# Patient Record
Sex: FEMALE | Race: WHITE | NOT HISPANIC OR LATINO | Employment: OTHER | URBAN - NONMETROPOLITAN AREA
[De-identification: names, ages, dates, MRNs, and addresses within clinical notes are randomized per-mention and may not be internally consistent; named-entity substitution may affect disease eponyms.]

---

## 2021-06-22 DIAGNOSIS — N64.4 MASTODYNIA: ICD-10-CM

## 2021-07-14 ENCOUNTER — HOSPITAL ENCOUNTER (OUTPATIENT)
Dept: RADIOLOGY | Facility: CLINIC | Age: 61
Discharge: HOME | End: 2021-07-14
Payer: COMMERCIAL

## 2021-07-14 VITALS — WEIGHT: 165 LBS | HEIGHT: 62 IN | BODY MASS INDEX: 30.36 KG/M2

## 2021-07-14 DIAGNOSIS — N64.4 MASTODYNIA: ICD-10-CM

## 2021-07-14 PROCEDURE — 77066 DX MAMMO INCL CAD BI: CPT

## 2021-07-14 PROCEDURE — 76642 ULTRASOUND BREAST LIMITED: CPT

## 2021-07-14 PROCEDURE — G0279 TOMOSYNTHESIS, MAMMO: HCPCS

## 2022-04-06 ENCOUNTER — DOCTOR'S OFFICE (OUTPATIENT)
Dept: URBAN - NONMETROPOLITAN AREA CLINIC 1 | Facility: CLINIC | Age: 62
Setting detail: OPHTHALMOLOGY
End: 2022-04-06
Payer: COMMERCIAL

## 2022-04-06 ENCOUNTER — OPTICAL OFFICE (OUTPATIENT)
Dept: URBAN - NONMETROPOLITAN AREA CLINIC 4 | Facility: CLINIC | Age: 62
Setting detail: OPHTHALMOLOGY
End: 2022-04-06
Payer: COMMERCIAL

## 2022-04-06 DIAGNOSIS — E11.3293: ICD-10-CM

## 2022-04-06 DIAGNOSIS — H52.223: ICD-10-CM

## 2022-04-06 DIAGNOSIS — H52.4: ICD-10-CM

## 2022-04-06 DIAGNOSIS — H40.023: ICD-10-CM

## 2022-04-06 DIAGNOSIS — H25.013: ICD-10-CM

## 2022-04-06 PROCEDURE — 92004 COMPRE OPH EXAM NEW PT 1/>: CPT | Performed by: OPTOMETRIST

## 2022-04-06 PROCEDURE — V2020 VISION SVCS FRAMES PURCHASES: HCPCS | Performed by: OPTOMETRIST

## 2022-04-06 PROCEDURE — 92015 DETERMINE REFRACTIVE STATE: CPT | Performed by: OPTOMETRIST

## 2022-04-06 PROCEDURE — V2784 LENS POLYCARB OR EQUAL: HCPCS | Performed by: OPTOMETRIST

## 2022-04-06 PROCEDURE — V2203 LENS SPHCYL BIFOCAL 4.00D/.1: HCPCS | Performed by: OPTOMETRIST

## 2022-04-06 PROCEDURE — 92134 CPTRZ OPH DX IMG PST SGM RTA: CPT | Performed by: OPTOMETRIST

## 2022-04-06 PROCEDURE — V2202 LENS SPHERE BIFOCAL 7.12-20.: HCPCS | Performed by: OPTOMETRIST

## 2022-04-06 ASSESSMENT — REFRACTION_MANIFEST
OD_VA1: 20/25-2
OS_CYLINDER: -1.75
OD_VA2: 20/25-2
OD_CYLINDER: -1.25
OD_AXIS: 105
OS_VA2: 20/25-2
OD_SPHERE: PLANO
OD_ADD: +2.50
OS_SPHERE: +0.25
OS_VA1: 20/25-2
OS_ADD: +2.50
OS_AXIS: 075

## 2022-04-06 ASSESSMENT — VISUAL ACUITY
OS_BCVA: 20/30
OD_BCVA: 20/30-2

## 2022-04-06 ASSESSMENT — TONOMETRY
OS_IOP_MMHG: 18
OD_IOP_MMHG: 18

## 2022-04-06 ASSESSMENT — CONFRONTATIONAL VISUAL FIELD TEST (CVF)
OD_FINDINGS: FULL
OS_FINDINGS: FULL

## 2022-04-06 ASSESSMENT — SPHEQUIV_DERIVED: OS_SPHEQUIV: -0.625

## 2022-05-04 ENCOUNTER — DOCTOR'S OFFICE (OUTPATIENT)
Dept: URBAN - NONMETROPOLITAN AREA CLINIC 1 | Facility: CLINIC | Age: 62
Setting detail: OPHTHALMOLOGY
End: 2022-05-04

## 2022-05-04 DIAGNOSIS — H52.4: ICD-10-CM

## 2022-05-04 PROCEDURE — RX CHECK RX CHECK: Performed by: OPTOMETRIST

## 2022-05-04 ASSESSMENT — REFRACTION_AUTOREFRACTION
OS_SPHERE: +1.00
OD_CYLINDER: -1.50
OS_CYLINDER: -1.25
OD_SPHERE: +0.75
OD_AXIS: 112
OS_AXIS: 077

## 2022-05-04 ASSESSMENT — REFRACTION_MANIFEST
OD_VA2: 20/25-2
OD_SPHERE: PLANO
OS_CYLINDER: -1.75
OS_ADD: +2.50
OD_AXIS: 105
OD_ADD: +2.50
OD_CYLINDER: -1.25
OS_SPHERE: +0.25
OS_AXIS: 075
OS_VA1: 20/25-2
OS_VA2: 20/25-2
OD_VA1: 20/25-2

## 2022-05-04 ASSESSMENT — VISUAL ACUITY
OD_BCVA: 20/25+1
OS_BCVA: 20/25

## 2022-05-04 ASSESSMENT — SPHEQUIV_DERIVED
OS_SPHEQUIV: 0.375
OS_SPHEQUIV: -0.625
OD_SPHEQUIV: 0

## 2022-05-04 ASSESSMENT — CONFRONTATIONAL VISUAL FIELD TEST (CVF)
OS_FINDINGS: FULL
OD_FINDINGS: FULL

## 2022-05-12 ENCOUNTER — OPTICAL OFFICE (OUTPATIENT)
Dept: URBAN - NONMETROPOLITAN AREA CLINIC 4 | Facility: CLINIC | Age: 62
Setting detail: OPHTHALMOLOGY
End: 2022-05-12
Payer: COMMERCIAL

## 2022-05-12 DIAGNOSIS — H52.223: ICD-10-CM

## 2022-05-12 PROCEDURE — V2020 VISION SVCS FRAMES PURCHASES: HCPCS | Performed by: OPTOMETRIST

## 2022-05-12 PROCEDURE — V2784 LENS POLYCARB OR EQUAL: HCPCS | Performed by: OPTOMETRIST

## 2022-05-12 PROCEDURE — V2202 LENS SPHERE BIFOCAL 7.12-20.: HCPCS | Performed by: OPTOMETRIST

## 2022-05-12 PROCEDURE — V2203 LENS SPHCYL BIFOCAL 4.00D/.1: HCPCS | Performed by: OPTOMETRIST

## 2022-05-12 PROCEDURE — V2799 MISC VISION ITEM OR SERVICE: HCPCS | Performed by: OPTOMETRIST

## 2022-06-14 ENCOUNTER — DOCTOR'S OFFICE (OUTPATIENT)
Dept: URBAN - NONMETROPOLITAN AREA CLINIC 1 | Facility: CLINIC | Age: 62
Setting detail: OPHTHALMOLOGY
End: 2022-06-14
Payer: COMMERCIAL

## 2022-06-14 DIAGNOSIS — H52.4: ICD-10-CM

## 2022-06-14 DIAGNOSIS — H52.223: ICD-10-CM

## 2022-06-14 PROBLEM — H25.013 CORTICAL CATARACT; BOTH EYES: Status: ACTIVE | Noted: 2022-04-06

## 2022-06-14 PROBLEM — E11.3293 DM TYPE 2; BOTH MILD WITHOUT ME: Status: ACTIVE | Noted: 2022-04-06

## 2022-06-14 PROBLEM — H40.023 GLAUCOMA SUSPECT, HIGH RISK; BOTH EYES: Status: ACTIVE | Noted: 2022-04-06

## 2022-06-14 PROCEDURE — RX CHECK RX CHECK: Performed by: OPTOMETRIST

## 2022-06-14 ASSESSMENT — REFRACTION_AUTOREFRACTION
OS_CYLINDER: -1.50
OS_AXIS: 084
OS_SPHERE: +1.00
OD_CYLINDER: -1.75
OD_AXIS: 112
OD_SPHERE: +0.25

## 2022-06-14 ASSESSMENT — REFRACTION_CURRENTRX
OS_ADD: +1.00
OD_OVR_VA: 20/
OS_OVR_VA: 20/
OS_CYLINDER: -1.75
OD_VPRISM_DIRECTION: BF
OD_SPHERE: PLANO
OS_OVR_VA: 20/
OS_VPRISM_DIRECTION: BF
OD_OVR_VA: 20/
OD_CYLINDER: SPH
OS_SPHERE: PLANO
OD_ADD: +2.50
OD_SPHERE: +0.25
OS_CYLINDER: SPH
OS_VPRISM_DIRECTION: BF
OD_CYLINDER: -1.25
OD_VPRISM_DIRECTION: BF
OD_ADD: +1.00
OD_AXIS: 105
OS_SPHERE: +0.25
OS_AXIS: 078
OS_ADD: +2.50

## 2022-06-14 ASSESSMENT — SPHEQUIV_DERIVED
OS_SPHEQUIV: 0.25
OS_SPHEQUIV: -0.625
OD_SPHEQUIV: -0.625

## 2022-06-14 ASSESSMENT — REFRACTION_MANIFEST
OD_SPHERE: PLANO
OS_VA1: 20/25-2
OS_CYLINDER: -1.75
OD_VA1: 20/25-2
OD_AXIS: 105
OS_SPHERE: +0.25
OS_AXIS: 075
OD_VA2: 20/25-2
OS_ADD: +1.75
OD_ADD: +1.75
OD_CYLINDER: -1.25
OS_VA2: 20/25-2

## 2022-06-14 ASSESSMENT — VISUAL ACUITY
OD_BCVA: 20/40
OS_BCVA: 20/40

## 2022-06-14 ASSESSMENT — CONFRONTATIONAL VISUAL FIELD TEST (CVF)
OD_FINDINGS: FULL
OS_FINDINGS: FULL

## 2022-11-16 ENCOUNTER — HOSPITAL ENCOUNTER (OUTPATIENT)
Dept: RADIOLOGY | Facility: CLINIC | Age: 62
Discharge: HOME | End: 2022-11-16

## 2022-11-16 VITALS — WEIGHT: 187 LBS | BODY MASS INDEX: 34.41 KG/M2 | HEIGHT: 62 IN

## 2022-11-16 DIAGNOSIS — N64.4 MASTODYNIA: ICD-10-CM

## 2022-11-16 DIAGNOSIS — N64.4 BREAST PAIN, LEFT: ICD-10-CM

## 2022-12-06 ENCOUNTER — DOCTOR'S OFFICE (OUTPATIENT)
Dept: URBAN - NONMETROPOLITAN AREA CLINIC 1 | Facility: CLINIC | Age: 62
Setting detail: OPHTHALMOLOGY
End: 2022-12-06
Payer: COMMERCIAL

## 2022-12-06 DIAGNOSIS — H25.013: ICD-10-CM

## 2022-12-06 DIAGNOSIS — H40.023: ICD-10-CM

## 2022-12-06 DIAGNOSIS — E11.3293: ICD-10-CM

## 2022-12-06 PROCEDURE — 92014 COMPRE OPH EXAM EST PT 1/>: CPT | Performed by: OPTOMETRIST

## 2022-12-06 PROCEDURE — 92133 CPTRZD OPH DX IMG PST SGM ON: CPT | Performed by: OPTOMETRIST

## 2022-12-06 ASSESSMENT — CONFRONTATIONAL VISUAL FIELD TEST (CVF)
OD_FINDINGS: FULL
OS_FINDINGS: FULL

## 2022-12-06 ASSESSMENT — TONOMETRY
OD_IOP_MMHG: 16
OS_IOP_MMHG: 16

## 2022-12-07 ASSESSMENT — REFRACTION_CURRENTRX
OS_SPHERE: +0.25
OS_ADD: +1.75
OS_VPRISM_DIRECTION: BF
OS_VPRISM_DIRECTION: BF
OD_CYLINDER: -1.25
OD_ADD: +1.00
OD_SPHERE: PLANO
OD_AXIS: 103
OS_SPHERE: PLANO
OD_OVR_VA: 20/
OD_SPHERE: PLANO
OD_VPRISM_DIRECTION: BF
OD_OVR_VA: 20/
OS_CYLINDER: -1.75
OD_ADD: +1.75
OS_ADD: +1.00
OS_CYLINDER: SPH
OD_CYLINDER: SPH
OS_OVR_VA: 20/
OD_VPRISM_DIRECTION: BF
OS_AXIS: 078
OS_OVR_VA: 20/

## 2022-12-07 ASSESSMENT — REFRACTION_AUTOREFRACTION
OS_CYLINDER: -1.75
OS_AXIS: 068
OD_SPHERE: +0.50
OS_SPHERE: +1.00
OD_AXIS: 111
OD_CYLINDER: -1.50

## 2022-12-07 ASSESSMENT — REFRACTION_MANIFEST
OD_CYLINDER: -1.25
OS_VA2: 20/25-2
OS_AXIS: 075
OS_VA1: 20/25-2
OS_ADD: +1.75
OD_ADD: +1.75
OD_SPHERE: PLANO
OS_SPHERE: +0.25
OD_VA1: 20/25-2
OS_CYLINDER: -1.75
OD_VA2: 20/25-2
OD_AXIS: 105

## 2022-12-07 ASSESSMENT — VISUAL ACUITY
OS_BCVA: 20/50-1
OD_BCVA: 20/30-1

## 2022-12-07 ASSESSMENT — SPHEQUIV_DERIVED
OS_SPHEQUIV: -0.625
OD_SPHEQUIV: -0.25
OS_SPHEQUIV: 0.125

## 2023-06-02 ENCOUNTER — RX ONLY (RX ONLY)
Age: 63
End: 2023-06-02

## 2023-06-02 ENCOUNTER — DOCTOR'S OFFICE (OUTPATIENT)
Dept: URBAN - NONMETROPOLITAN AREA CLINIC 1 | Facility: CLINIC | Age: 63
Setting detail: OPHTHALMOLOGY
End: 2023-06-02
Payer: COMMERCIAL

## 2023-06-02 DIAGNOSIS — H53.15: ICD-10-CM

## 2023-06-02 DIAGNOSIS — E11.3293: ICD-10-CM

## 2023-06-02 DIAGNOSIS — H40.023: ICD-10-CM

## 2023-06-02 DIAGNOSIS — H25.013: ICD-10-CM

## 2023-06-02 PROCEDURE — 92134 CPTRZ OPH DX IMG PST SGM RTA: CPT | Performed by: OPTOMETRIST

## 2023-06-02 PROCEDURE — 92014 COMPRE OPH EXAM EST PT 1/>: CPT | Performed by: OPTOMETRIST

## 2023-06-02 ASSESSMENT — SPHEQUIV_DERIVED
OS_SPHEQUIV: 0.25
OS_SPHEQUIV: -0.625
OD_SPHEQUIV: 0

## 2023-06-02 ASSESSMENT — TONOMETRY
OS_IOP_MMHG: 16
OD_IOP_MMHG: 16

## 2023-06-02 ASSESSMENT — REFRACTION_MANIFEST
OD_VA2: 20/25-2
OS_CYLINDER: -1.75
OD_SPHERE: PLANO
OS_AXIS: 075
OD_VA1: 20/25-2
OS_VA2: 20/25-2
OD_CYLINDER: -1.25
OS_VA1: 20/25-2
OD_AXIS: 105
OD_ADD: +1.75
OS_SPHERE: +0.25
OS_ADD: +1.75

## 2023-06-02 ASSESSMENT — REFRACTION_CURRENTRX
OS_VPRISM_DIRECTION: BF
OS_CYLINDER: -1.75
OD_AXIS: 098
OD_SPHERE: PLANO
OD_SPHERE: PLANO
OD_CYLINDER: SPH
OD_CYLINDER: -1.25
OD_OVR_VA: 20/
OS_AXIS: 077
OS_CYLINDER: SPH
OS_SPHERE: +0.25
OD_OVR_VA: 20/
OD_VPRISM_DIRECTION: BF
OD_ADD: +1.75
OS_SPHERE: PLANO
OS_OVR_VA: 20/
OS_OVR_VA: 20/
OD_ADD: +1.00
OS_VPRISM_DIRECTION: BF
OS_ADD: +1.00
OS_ADD: +1.75
OD_VPRISM_DIRECTION: BF

## 2023-06-02 ASSESSMENT — REFRACTION_AUTOREFRACTION
OD_AXIS: 107
OS_SPHERE: +1.00
OS_AXIS: 063
OD_SPHERE: +0.75
OS_CYLINDER: -1.50
OD_CYLINDER: -1.50

## 2023-06-02 ASSESSMENT — VISUAL ACUITY
OS_BCVA: 20/25-21
OD_BCVA: 20/20-1

## 2023-06-02 ASSESSMENT — CONFRONTATIONAL VISUAL FIELD TEST (CVF)
OD_FINDINGS: FULL
OS_FINDINGS: FULL

## 2023-06-28 ENCOUNTER — DOCTOR'S OFFICE (OUTPATIENT)
Dept: URBAN - NONMETROPOLITAN AREA CLINIC 1 | Facility: CLINIC | Age: 63
Setting detail: OPHTHALMOLOGY
End: 2023-06-28
Payer: COMMERCIAL

## 2023-06-28 DIAGNOSIS — H40.023: ICD-10-CM

## 2023-06-28 DIAGNOSIS — E11.3293: ICD-10-CM

## 2023-06-28 DIAGNOSIS — H25.013: ICD-10-CM

## 2023-06-28 PROBLEM — H53.15 VISUAL DISTORTIONS: Status: RESOLVED | Noted: 2023-06-02 | Resolved: 2023-06-28

## 2023-06-28 PROCEDURE — 99214 OFFICE O/P EST MOD 30 MIN: CPT | Performed by: OPTOMETRIST

## 2023-06-28 PROCEDURE — 92083 EXTENDED VISUAL FIELD XM: CPT | Performed by: OPTOMETRIST

## 2023-06-28 PROCEDURE — 76514 ECHO EXAM OF EYE THICKNESS: CPT | Performed by: OPTOMETRIST

## 2023-06-28 ASSESSMENT — REFRACTION_MANIFEST
OD_CYLINDER: -1.25
OD_SPHERE: PLANO
OS_VA1: 20/25-2
OS_AXIS: 075
OS_SPHERE: +0.25
OD_AXIS: 105
OS_ADD: +1.75
OD_VA1: 20/25-2
OS_VA2: 20/25-2
OD_VA2: 20/25-2
OS_CYLINDER: -1.75
OD_ADD: +1.75

## 2023-06-28 ASSESSMENT — REFRACTION_CURRENTRX
OD_SPHERE: PLANO
OD_AXIS: 101
OD_OVR_VA: 20/
OD_VPRISM_DIRECTION: BF
OD_SPHERE: PLANO
OS_SPHERE: PLANO
OD_VPRISM_DIRECTION: BF
OD_CYLINDER: SPH
OS_OVR_VA: 20/
OS_CYLINDER: SPH
OS_ADD: +2.00
OD_OVR_VA: 20/
OS_VPRISM_DIRECTION: BF
OS_AXIS: 074
OD_CYLINDER: -1.25
OD_ADD: +1.00
OS_SPHERE: +0.25
OS_CYLINDER: -1.75
OS_VPRISM_DIRECTION: BF
OS_OVR_VA: 20/
OD_ADD: +2.00
OS_ADD: +1.00

## 2023-06-28 ASSESSMENT — REFRACTION_AUTOREFRACTION
OD_SPHERE: +1.00
OS_SPHERE: +0.75
OD_AXIS: 112
OD_CYLINDER: -2.25
OS_CYLINDER: -1.25
OS_AXIS: 055

## 2023-06-28 ASSESSMENT — PACHYMETRY
OD_CT_CORRECTION: -4
OS_CT_UM: 596
OD_CT_UM: 602
OS_CT_CORRECTION: -4

## 2023-06-28 ASSESSMENT — CONFRONTATIONAL VISUAL FIELD TEST (CVF)
OS_FINDINGS: FULL
OD_FINDINGS: FULL

## 2023-06-28 ASSESSMENT — TONOMETRY
OD_IOP_MMHG: 16
OS_IOP_MMHG: 16

## 2023-06-28 ASSESSMENT — VISUAL ACUITY
OD_BCVA: 20/25-2
OS_BCVA: 20/30+2

## 2023-06-28 ASSESSMENT — SPHEQUIV_DERIVED
OD_SPHEQUIV: -0.125
OS_SPHEQUIV: -0.625
OS_SPHEQUIV: 0.125

## 2023-09-19 LAB — HBA1C MFR BLD HPLC: 8.9 %

## 2023-10-03 ENCOUNTER — TELEPHONE (OUTPATIENT)
Dept: NON INVASIVE DIAGNOSTICS | Facility: HOSPITAL | Age: 63
End: 2023-10-03

## 2023-10-03 NOTE — TELEPHONE ENCOUNTER
Spoke with patient in regard to father's allergy to contrast dye. Pt was unsure if father was allergic to xray or ct scan dye. Patient has never had a previous MRI with contrast.  No indication for allergy prep needed. Called [facility] department, spoke with robyn to confirm patient does not need to be allergy prepped.  Recommended patient to call ordering provider and get scans switched to without contrast if patient feels uncomfortable receiving MRI contrast.

## 2023-10-18 ENCOUNTER — HOSPITAL ENCOUNTER (OUTPATIENT)
Dept: MRI IMAGING | Facility: HOSPITAL | Age: 63
Discharge: HOME | End: 2023-10-18
Payer: COMMERCIAL

## 2023-10-18 DIAGNOSIS — R93.89 ABNORMAL FINDINGS ON DIAGNOSTIC IMAGING OF OTHER SPECIFIED BODY STRUCTURES: ICD-10-CM

## 2023-10-18 DIAGNOSIS — R16.0 HEPATOMEGALY, NOT ELSEWHERE CLASSIFIED: ICD-10-CM

## 2023-10-18 DIAGNOSIS — N89.9 NONINFLAMMATORY DISORDER OF VAGINA, UNSPECIFIED: ICD-10-CM

## 2023-10-18 PROCEDURE — 72195 MRI PELVIS W/O DYE: CPT

## 2023-10-18 PROCEDURE — 74181 MRI ABDOMEN W/O CONTRAST: CPT

## 2024-01-16 ENCOUNTER — OPTICAL OFFICE (OUTPATIENT)
Dept: URBAN - NONMETROPOLITAN AREA CLINIC 4 | Facility: CLINIC | Age: 64
Setting detail: OPHTHALMOLOGY
End: 2024-01-16
Payer: COMMERCIAL

## 2024-01-16 ENCOUNTER — DOCTOR'S OFFICE (OUTPATIENT)
Dept: URBAN - NONMETROPOLITAN AREA CLINIC 1 | Facility: CLINIC | Age: 64
Setting detail: OPHTHALMOLOGY
End: 2024-01-16
Payer: COMMERCIAL

## 2024-01-16 DIAGNOSIS — H25.013: ICD-10-CM

## 2024-01-16 DIAGNOSIS — H52.223: ICD-10-CM

## 2024-01-16 DIAGNOSIS — H52.4: ICD-10-CM

## 2024-01-16 DIAGNOSIS — H40.023: ICD-10-CM

## 2024-01-16 DIAGNOSIS — E11.3293: ICD-10-CM

## 2024-01-16 PROCEDURE — V2784 LENS POLYCARB OR EQUAL: HCPCS | Mod: LT | Performed by: OPTOMETRIST

## 2024-01-16 PROCEDURE — V2020 VISION SVCS FRAMES PURCHASES: HCPCS | Performed by: OPTOMETRIST

## 2024-01-16 PROCEDURE — V2103 SPHEROCYLINDR 4.00D/12-2.00D: HCPCS | Performed by: OPTOMETRIST

## 2024-01-16 PROCEDURE — 92014 COMPRE OPH EXAM EST PT 1/>: CPT | Performed by: OPTOMETRIST

## 2024-01-16 PROCEDURE — V2784 LENS POLYCARB OR EQUAL: HCPCS | Performed by: OPTOMETRIST

## 2024-01-16 PROCEDURE — V2103 SPHEROCYLINDR 4.00D/12-2.00D: HCPCS | Mod: LT | Performed by: OPTOMETRIST

## 2024-01-16 PROCEDURE — 92083 EXTENDED VISUAL FIELD XM: CPT | Performed by: OPTOMETRIST

## 2024-01-16 PROCEDURE — 92015 DETERMINE REFRACTIVE STATE: CPT | Performed by: OPTOMETRIST

## 2024-01-16 ASSESSMENT — CONFRONTATIONAL VISUAL FIELD TEST (CVF)
OS_FINDINGS: FULL
OD_FINDINGS: FULL

## 2024-01-19 ENCOUNTER — OPTICAL OFFICE (OUTPATIENT)
Dept: URBAN - NONMETROPOLITAN AREA CLINIC 4 | Facility: CLINIC | Age: 64
Setting detail: OPHTHALMOLOGY
End: 2024-01-19
Payer: COMMERCIAL

## 2024-01-19 DIAGNOSIS — H52.223: ICD-10-CM

## 2024-01-19 PROCEDURE — V2784 LENS POLYCARB OR EQUAL: HCPCS | Performed by: OPTOMETRIST

## 2024-01-19 PROCEDURE — V2784 LENS POLYCARB OR EQUAL: HCPCS | Mod: LT | Performed by: OPTOMETRIST

## 2024-01-19 PROCEDURE — V2103 SPHEROCYLINDR 4.00D/12-2.00D: HCPCS | Performed by: OPTOMETRIST

## 2024-01-19 PROCEDURE — V2020 VISION SVCS FRAMES PURCHASES: HCPCS | Performed by: OPTOMETRIST

## 2024-01-19 PROCEDURE — V2103 SPHEROCYLINDR 4.00D/12-2.00D: HCPCS | Mod: LT | Performed by: OPTOMETRIST

## 2024-01-19 ASSESSMENT — REFRACTION_CURRENTRX
OS_VPRISM_DIRECTION: BF
OD_VPRISM_DIRECTION: BF
OD_SPHERE: PLANO
OD_OVR_VA: 20/
OS_VPRISM_DIRECTION: BF
OD_AXIS: 101
OS_OVR_VA: 20/
OS_SPHERE: +0.25
OD_ADD: +1.00
OS_AXIS: 074
OD_CYLINDER: -1.25
OS_OVR_VA: 20/
OS_ADD: +2.00
OS_ADD: +1.00
OD_ADD: +2.00
OS_CYLINDER: -1.75
OD_SPHERE: PLANO
OS_CYLINDER: SPH
OD_OVR_VA: 20/
OS_SPHERE: PLANO
OD_VPRISM_DIRECTION: BF
OD_CYLINDER: SPH

## 2024-01-19 ASSESSMENT — SPHEQUIV_DERIVED
OD_SPHEQUIV: -0.125
OS_SPHEQUIV: -0.25
OD_SPHEQUIV: -0.25
OS_SPHEQUIV: -0.5

## 2024-01-19 ASSESSMENT — REFRACTION_AUTOREFRACTION
OS_CYLINDER: -1.00
OS_SPHERE: +0.25
OD_CYLINDER: -2.25
OD_SPHERE: +1.00
OD_AXIS: 114
OS_AXIS: 062

## 2024-01-19 ASSESSMENT — REFRACTION_MANIFEST
OD_AXIS: 110
OS_VA1: 20/25-2
OD_ADD: +2.25
OD_VA1: 20/30
OS_ADD: +2.25
OS_AXIS: 065
OS_CYLINDER: -1.50
OD_SPHERE: +0.75
OS_VA2: 20/25-2
OD_CYLINDER: -2.00
OD_VA2: 20/30
OS_SPHERE: +0.25

## 2024-01-22 ENCOUNTER — HOSPITAL ENCOUNTER (OUTPATIENT)
Dept: ULTRASOUND IMAGING | Facility: HOSPITAL | Age: 64
Discharge: HOME | End: 2024-01-22
Payer: COMMERCIAL

## 2024-01-22 DIAGNOSIS — K82.4 CHOLESTEROLOSIS OF GALLBLADDER: ICD-10-CM

## 2024-01-22 PROCEDURE — 76705 ECHO EXAM OF ABDOMEN: CPT

## 2024-07-02 ENCOUNTER — DOCTOR'S OFFICE (OUTPATIENT)
Dept: URBAN - NONMETROPOLITAN AREA CLINIC 1 | Facility: CLINIC | Age: 64
Setting detail: OPHTHALMOLOGY
End: 2024-07-02
Payer: COMMERCIAL

## 2024-07-02 DIAGNOSIS — H25.013: ICD-10-CM

## 2024-07-02 DIAGNOSIS — H40.023: ICD-10-CM

## 2024-07-02 DIAGNOSIS — H43.393: ICD-10-CM

## 2024-07-02 DIAGNOSIS — E11.3293: ICD-10-CM

## 2024-07-02 PROCEDURE — 99214 OFFICE O/P EST MOD 30 MIN: CPT | Performed by: OPTOMETRIST

## 2024-07-02 PROCEDURE — 92133 CPTRZD OPH DX IMG PST SGM ON: CPT | Performed by: OPTOMETRIST

## 2024-07-02 ASSESSMENT — LID EXAM ASSESSMENTS
OD_ECCHYMOSIS: RLL T 1+
OD_EDEMA: RLL T 1+

## 2024-07-02 ASSESSMENT — CONFRONTATIONAL VISUAL FIELD TEST (CVF)
OD_FINDINGS: FULL
OS_FINDINGS: FULL

## 2024-10-02 ENCOUNTER — HOSPITAL ENCOUNTER (OUTPATIENT)
Dept: MRI IMAGING | Facility: HOSPITAL | Age: 64
Discharge: HOME | End: 2024-10-02
Payer: COMMERCIAL

## 2024-10-02 DIAGNOSIS — K86.2 CYST OF PANCREAS: ICD-10-CM

## 2024-10-02 PROCEDURE — 74181 MRI ABDOMEN W/O CONTRAST: CPT

## 2025-07-15 ENCOUNTER — DOCTOR'S OFFICE (OUTPATIENT)
Dept: URBAN - NONMETROPOLITAN AREA CLINIC 1 | Facility: CLINIC | Age: 65
Setting detail: OPHTHALMOLOGY
End: 2025-07-15
Payer: COMMERCIAL

## 2025-07-15 DIAGNOSIS — H52.4: ICD-10-CM

## 2025-07-15 DIAGNOSIS — H25.013: ICD-10-CM

## 2025-07-15 DIAGNOSIS — H43.393: ICD-10-CM

## 2025-07-15 DIAGNOSIS — E11.3293: ICD-10-CM

## 2025-07-15 DIAGNOSIS — H40.023: ICD-10-CM

## 2025-07-15 DIAGNOSIS — H52.223: ICD-10-CM

## 2025-07-15 PROCEDURE — 92014 COMPRE OPH EXAM EST PT 1/>: CPT | Performed by: OPTOMETRIST

## 2025-07-15 PROCEDURE — 92015 DETERMINE REFRACTIVE STATE: CPT | Performed by: OPTOMETRIST

## 2025-07-15 PROCEDURE — 92134 CPTRZ OPH DX IMG PST SGM RTA: CPT | Performed by: OPTOMETRIST

## 2025-07-15 ASSESSMENT — REFRACTION_MANIFEST
OS_VA2: 20/25-2
OS_ADD: +2.50
OS_SPHERE: +0.50
OD_ADD: +2.50
OS_AXIS: 065
OS_CYLINDER: -1.50
OS_VA1: 20/25-2
OD_VA1: 20/30
OD_SPHERE: +0.50
OD_VA2: 20/30
OD_CYLINDER: -2.25
OD_AXIS: 110

## 2025-07-15 ASSESSMENT — REFRACTION_AUTOREFRACTION
OD_AXIS: 110
OS_AXIS: 062
OD_CYLINDER: -2.25
OD_SPHERE: +0.50
OS_CYLINDER: -1.50
OS_SPHERE: +0.50

## 2025-07-15 ASSESSMENT — REFRACTION_CURRENTRX
OD_OVR_VA: 20/
OS_VPRISM_DIRECTION: BF
OS_CYLINDER: SPH
OD_VPRISM_DIRECTION: BF
OD_SPHERE: PLANO
OD_CYLINDER: -1.25
OD_ADD: +2.00
OD_CYLINDER: SPH
OD_SPHERE: PLANO
OS_ADD: +2.00
OS_OVR_VA: 20/
OS_AXIS: 080
OD_AXIS: 106
OS_VPRISM_DIRECTION: BF
OS_CYLINDER: -1.75
OS_SPHERE: PLANO
OS_OVR_VA: 20/
OD_OVR_VA: 20/
OD_VPRISM_DIRECTION: BF
OS_ADD: +1.00
OD_ADD: +1.00
OS_SPHERE: +0.25

## 2025-07-15 ASSESSMENT — VISUAL ACUITY
OD_BCVA: 20/40
OS_BCVA: 20/30

## 2025-07-15 ASSESSMENT — PACHYMETRY
OS_CT_CORRECTION: -4
OS_CT_UM: 596
OD_CT_UM: 602
OD_CT_CORRECTION: -4

## 2025-07-15 ASSESSMENT — CONFRONTATIONAL VISUAL FIELD TEST (CVF)
OS_FINDINGS: FULL
OD_FINDINGS: FULL

## 2025-07-15 ASSESSMENT — TONOMETRY
OD_IOP_MMHG: 18
OS_IOP_MMHG: 18